# Patient Record
Sex: MALE | Race: WHITE | NOT HISPANIC OR LATINO | Employment: OTHER | URBAN - METROPOLITAN AREA
[De-identification: names, ages, dates, MRNs, and addresses within clinical notes are randomized per-mention and may not be internally consistent; named-entity substitution may affect disease eponyms.]

---

## 2017-01-19 ENCOUNTER — PROCEDURE VISIT (OUTPATIENT)
Dept: NEUROLOGY | Facility: CLINIC | Age: 62
End: 2017-01-19
Payer: COMMERCIAL

## 2017-01-19 DIAGNOSIS — R20.0 NUMBNESS IN FEET: ICD-10-CM

## 2017-01-19 DIAGNOSIS — E11.42 DIABETIC POLYNEUROPATHY ASSOCIATED WITH TYPE 2 DIABETES MELLITUS: Primary | ICD-10-CM

## 2017-01-19 PROCEDURE — 95910 NRV CNDJ TEST 7-8 STUDIES: CPT | Mod: S$GLB,,, | Performed by: PSYCHIATRY & NEUROLOGY

## 2017-01-19 PROCEDURE — 95886 MUSC TEST DONE W/N TEST COMP: CPT | Mod: S$GLB,,, | Performed by: PSYCHIATRY & NEUROLOGY

## 2017-01-19 NOTE — PROCEDURES
EMG - 2 Extremities  Date/Time: 1/19/2017 1:45 PM  Performed by: LATANYA SEYMOUR  Authorized by: LATANYA SEYMOUR     Nerve conduction test  Date/Time: 1/19/2017 1:45 PM  Performed by: LATANYA SEYMOUR  Authorized by: LATANYA SEYMOUR       REPORT OF EMG and NERVE CONDUCTION STUDY    Name: Flo Warren  Date of Study:  1/19/17  Referring Physician:  Ashlee  Test Performed by:  MD Ashlee  Full Values Attached  Informed Consent Scanned.   No anesthesia used.   Amount of Blood Loss: none. The patient tolerated this procedure well.       Informed consent was obtained prior to performing this study. Two patient identifiers were confirmed with the patient prior to performing this study. A time out to determine correct patient and and agreement on procedure performed was conducted prior to the concentric needle examination.    Reason for the study:  Numb feet      Findings:   Nerve conduction studies of the bilateral peroneal motor, bilateral tibial motor, bilateral sural nerves and bilateral H-reflexes were performed.   Amplitudes were reduced throughout without conduction block. Otherwise, distal latencies, conduction velocities were well preservel. H reflexes were absent.   EMG of selected muscles of the bilateral legswere performed as indicated on the attached sheets. There was some decreased recruitment. Otherwise,  Insertional activity was normal without fasciculation or fibrillation, normal sized and phasia of motor units.      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 [facility].     Recommendations (disucssed at this visit):  -Neuropathy symptoms are improved on gabapentin (he is using bid-tid) but some burning at night persists. Change dose to one in the am and 2 in the evenings  -Neuropathic cream helps and can add  metanx per orders unless side effects  -Reduce A1C for better neuropathy control discussed as well.   RTC 4 months

## 2017-01-19 NOTE — MR AVS SNAPSHOT
[facility]  [address] [address]  Phone: [phone]  Fax: [phone]                  Flo Warren   2017 1:00 PM   Procedure visit    Description:  Male : 1955   Provider:  Mahendra Rosado MD   Department:  [facility]           Diagnoses this Visit        Comments    Diabetic polyneuropathy associated with type 2 diabetes mellitus    -  Primary     Numbness in feet                To Do List           Goals (5 Years of Data)     None      Follow-Up and Disposition     Return in about 4 months (around 2017).       These Medications        Disp Refills Start End    mecobal-levomefolat Ca-B6 phos 3-35-2 mg Tab 30 tablet 11 2017     Take 1 tablet by mouth once daily. Ok to substitute - Oral    Pharmacy: [facility]/pharmacy #[id number] - [place], [place] - [address] [address] #: [phone]         [facility]     [facility] On Call Nurse Care Line -  Assistance  Registered nurses in the [facility] On Call Center provide clinical advisement, health education, appointment booking, and other advisory services.  Call for this free service at [phone].             Medications           Message regarding Medications     Verify the changes and/or additions to your medication regime listed below are the same as discussed with your clinician today.  If any of these changes or additions are incorrect, please notify your healthcare provider.        START taking these NEW medications        Refills    mecobal-levomefolat Ca-B6 phos 3-35-2 mg Tab 11    Sig: Take 1 tablet by mouth once daily. Ok to substitute    Class: Normal    Route: Oral           Verify that the below list of medications is an accurate representation of the medications you are currently taking.  If none reported, the list may be blank. If incorrect, please contact your healthcare provider. Carry this list with you in case of emergency.           Current Medications      amlodipine (NORVASC) 5 MG tablet Take 5 mg by mouth once daily.    gabapentin (NEURONTIN) 300 MG capsule Take1 tablet by mouth tid prn    losartan (COZAAR) 100 MG tablet Take 100 mg by mouth once daily.    mecobal-levomefolat Ca-B6 phos 3-35-2 mg Tab Take 1 tablet by mouth once daily. Ok to substitute    multivitamin (ONE DAILY MULTIVITAMIN) per tablet Take 1 tablet by mouth once daily.    NOVOLOG MIX 70-30 FLEXPEN 100 unit/mL (70-30) InPn pen INJECT 52 UNITS UNDER THE SKIN TWICE A DAY **NEEDS APT**    tamsulosin (FLOMAX) 0.4 mg Cp24 Take 1 capsule by mouth nightly.           Clinical Reference Information           Allergies as of 1/19/2017     No Known Allergies      Immunizations Administered on Date of Encounter - 1/19/2017     None      Orders Placed During Today's Visit      Normal Orders This Visit    EMG - 2 Extremities     Nerve conduction test

## 2017-05-22 ENCOUNTER — OFFICE VISIT (OUTPATIENT)
Dept: NEUROLOGY | Facility: CLINIC | Age: 62
End: 2017-05-22
Payer: COMMERCIAL

## 2017-05-22 VITALS
HEART RATE: 68 BPM | DIASTOLIC BLOOD PRESSURE: 88 MMHG | HEIGHT: 75 IN | WEIGHT: 251.31 LBS | SYSTOLIC BLOOD PRESSURE: 160 MMHG | BODY MASS INDEX: 31.25 KG/M2 | RESPIRATION RATE: 18 BRPM

## 2017-05-22 DIAGNOSIS — E66.09 NON MORBID OBESITY DUE TO EXCESS CALORIES: ICD-10-CM

## 2017-05-22 DIAGNOSIS — E11.42 DIABETIC POLYNEUROPATHY ASSOCIATED WITH TYPE 2 DIABETES MELLITUS: Primary | ICD-10-CM

## 2017-05-22 PROCEDURE — 99999 PR PBB SHADOW E&M-EST. PATIENT-LVL III: CPT | Mod: PBBFAC,,, | Performed by: PSYCHIATRY & NEUROLOGY

## 2017-05-22 PROCEDURE — 3046F HEMOGLOBIN A1C LEVEL >9.0%: CPT | Mod: 8P,S$GLB,, | Performed by: PSYCHIATRY & NEUROLOGY

## 2017-05-22 PROCEDURE — 4010F ACE/ARB THERAPY RXD/TAKEN: CPT | Mod: S$GLB,,, | Performed by: PSYCHIATRY & NEUROLOGY

## 2017-05-22 PROCEDURE — 1160F RVW MEDS BY RX/DR IN RCRD: CPT | Mod: S$GLB,,, | Performed by: PSYCHIATRY & NEUROLOGY

## 2017-05-22 PROCEDURE — 99214 OFFICE O/P EST MOD 30 MIN: CPT | Mod: S$GLB,,, | Performed by: PSYCHIATRY & NEUROLOGY

## 2017-05-22 NOTE — PROGRESS NOTES
HPI: Flo Warren is a 62 y.o. male with poorly controlled diabetes with numbness in the feet    Evening burning with gabapentin is relieved.  He has numbness in the bottom feet.  He has no real pain.  He has blood sugar running 130-180, no A1C checked recently.  Metanx was not tried    Review of Systems   Constitutional: Negative for fever.   HENT: Negative for nosebleeds.    Eyes: Negative for double vision.   Respiratory: Negative for hemoptysis.    Cardiovascular: Negative for leg swelling.   Gastrointestinal: Negative for blood in stool.   Genitourinary: Negative for hematuria.   Musculoskeletal: Negative for falls.   Skin: Negative for rash.   Neurological: Positive for sensory change.   Psychiatric/Behavioral: The patient does not have insomnia.          I have reviewed all of this patient's past medical and surgical histories as well as family and social histories and active allergies and medications as documented in the electronic medical record.        Exam:  Gen Appearance, well developed/nourished in no apparent distress  CV: 2+ distal pulses with no edema or swelling  Neuro:  MS: Awake, alert, . Sustains attention. Recent/remote memory intact, Language is full to spontaneous speech//comprehension. Fund of Knowledge is full  CN: Optic discs are flat with normal vasculature, PERRL, Extraoccular movements and visual fields are full. Normal facial sensation and strength, Hearing symmetric, Tongue and Palate are midline and strong. Shoulder Shrug symmetric and strong.  Motor: Normal bulk, tone, no abnormal movements. 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 male with poorly controlled diabetes with numbness in the feet. 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 Neuropathy symptoms are improved with gabapentin dosed at one in the am and 2 in the evenings  2. Neuropathic cream helps and can add metanx can be tried at any point  3. Reduce A1C for better neuropathy control including possible control of symptoms and to prevent worsening symptoms discussed as well. Reduce obesity.       RTC 6 months

## 2017-11-14 ENCOUNTER — OFFICE VISIT (OUTPATIENT)
Dept: NEUROLOGY | Facility: CLINIC | Age: 62
End: 2017-11-14
Payer: COMMERCIAL

## 2017-11-14 VITALS
HEIGHT: 75 IN | BODY MASS INDEX: 31.39 KG/M2 | HEART RATE: 70 BPM | WEIGHT: 252.44 LBS | SYSTOLIC BLOOD PRESSURE: 140 MMHG | RESPIRATION RATE: 16 BRPM | DIASTOLIC BLOOD PRESSURE: 68 MMHG

## 2017-11-14 DIAGNOSIS — E11.42 DIABETIC POLYNEUROPATHY ASSOCIATED WITH TYPE 2 DIABETES MELLITUS: Primary | ICD-10-CM

## 2017-11-14 DIAGNOSIS — E66.09 NON MORBID OBESITY DUE TO EXCESS CALORIES: ICD-10-CM

## 2017-11-14 PROCEDURE — 99999 PR PBB SHADOW E&M-EST. PATIENT-LVL III: CPT | Mod: PBBFAC,,, | Performed by: PSYCHIATRY & NEUROLOGY

## 2017-11-14 PROCEDURE — 99214 OFFICE O/P EST MOD 30 MIN: CPT | Mod: S$GLB,,, | Performed by: PSYCHIATRY & NEUROLOGY

## 2017-11-14 RX ORDER — DULAGLUTIDE 0.75 MG/.5ML
0.5 INJECTION, SOLUTION SUBCUTANEOUS WEEKLY
Refills: 1 | COMMUNITY
Start: 2017-10-23 | End: 2019-05-23

## 2017-11-14 RX ORDER — PRAVASTATIN SODIUM 20 MG/1
1 TABLET ORAL NIGHTLY
Refills: 11 | COMMUNITY
Start: 2017-11-11

## 2017-11-14 RX ORDER — GABAPENTIN 300 MG/1
CAPSULE ORAL
Qty: 120 CAPSULE | Refills: 11 | Status: SHIPPED | OUTPATIENT
Start: 2017-11-14 | End: 2018-12-03 | Stop reason: SDUPTHER

## 2017-11-14 RX ORDER — POTASSIUM CHLORIDE 750 MG/1
10 TABLET, EXTENDED RELEASE ORAL ONCE
COMMUNITY

## 2017-11-14 NOTE — PROGRESS NOTES
HPI:  Flo Warren is a 62 y.o. male with poorly controlled diabetes with numbness in the feet. 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 Last level was just above 7  His numbness in this feet is not active during the day. He feels increased numbness in the evening (after taking his shoes off).   He no longer has pain in the feet.   He does not snore, cough or gasp for air.    Review of Systems   Constitutional: Negative for fever.   HENT: Negative for nosebleeds.    Eyes: Negative for double vision.   Respiratory: Negative for hemoptysis.    Cardiovascular: Negative for chest pain and leg swelling.   Gastrointestinal: Negative for blood in stool.   Genitourinary: Negative for hematuria.   Musculoskeletal: Negative for falls.   Skin: Negative for rash.   Neurological: Positive for sensory change.   Psychiatric/Behavioral: The patient is not nervous/anxious.          I have reviewed all of this patient's past medical and surgical histories as well as family and social histories and active allergies and medications as documented in the electronic medical record.        Exam:  Gen Appearance, well developed/nourished in no apparent distress  CV: 2+ distal pulses with no edema or swelling  Neuro:  MS: Awake, alert, . Sustains attention. Recent/remote memory intact, Language is full to spontaneous speech/comprehension. Fund of Knowledge is full  CN: Optic discs are flat with normal vasculature, PERRL, Extraoccular movements and visual fields are full. Normal facial sensation and strength, Hearing symmetric, Tongue and Palate are midline and strong. Shoulder Shrug symmetric and strong.  Motor: Normal bulk, tone, no abnormal movements. 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 male with poorly controlled diabetes with numbness in the feet. 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 Neuropathy symptoms are improved during the daywith gabapentin dosed at one in the am and 2 in the evenings but he has breakthrough symptoms in the early evening- can raise dose to 1 in the am, 1 in the late mid day and 2 at night OR take none in the am, 1 in the mid day and 2 in the evening Unless sedation, mood changes or other side effects  2. Neuropathic cream helps and can and metanx can be tried at any point. Can try OTC B12 1000mcg to see if this helps symptoms  3. Reduce A1C (goal less than 7) for better neuropathy control including possible control of symptoms and to prevent worsening symptoms discussed as well. Reduce obesity.       RTC 6 months

## 2017-11-14 NOTE — PATIENT INSTRUCTIONS
You can change gabapentin to none in the am, 1 in the mid day, and 2 at night to see if this helps your early evening numbness.  IF pain breakthrough in the am, you can increase to 1 in the am, 1 at mid day and 2 at night.   Try B12 1000mcg daily for about 3 months to see if this helps your symptoms.

## 2018-12-03 RX ORDER — GABAPENTIN 300 MG/1
CAPSULE ORAL
Qty: 360 CAPSULE | Refills: 0 | Status: SHIPPED | OUTPATIENT
Start: 2018-12-03 | End: 2019-02-28 | Stop reason: SDUPTHER

## 2019-02-28 RX ORDER — GABAPENTIN 300 MG/1
CAPSULE ORAL
Qty: 360 CAPSULE | Refills: 1 | Status: SHIPPED | OUTPATIENT
Start: 2019-02-28 | End: 2019-05-23

## 2019-05-23 ENCOUNTER — OFFICE VISIT (OUTPATIENT)
Dept: NEUROLOGY | Facility: CLINIC | Age: 64
End: 2019-05-23
Payer: MEDICARE

## 2019-05-23 VITALS
BODY MASS INDEX: 30.51 KG/M2 | HEART RATE: 86 BPM | RESPIRATION RATE: 16 BRPM | HEIGHT: 75 IN | DIASTOLIC BLOOD PRESSURE: 72 MMHG | SYSTOLIC BLOOD PRESSURE: 138 MMHG | WEIGHT: 245.38 LBS

## 2019-05-23 DIAGNOSIS — E11.42 DIABETIC POLYNEUROPATHY ASSOCIATED WITH TYPE 2 DIABETES MELLITUS: Primary | ICD-10-CM

## 2019-05-23 DIAGNOSIS — E66.09 NON MORBID OBESITY DUE TO EXCESS CALORIES: ICD-10-CM

## 2019-05-23 PROCEDURE — 1101F PT FALLS ASSESS-DOCD LE1/YR: CPT | Mod: CPTII,S$GLB,, | Performed by: PSYCHIATRY & NEUROLOGY

## 2019-05-23 PROCEDURE — 1101F PR PT FALLS ASSESS DOC 0-1 FALLS W/OUT INJ PAST YR: ICD-10-PCS | Mod: CPTII,S$GLB,, | Performed by: PSYCHIATRY & NEUROLOGY

## 2019-05-23 PROCEDURE — 99999 PR PBB SHADOW E&M-EST. PATIENT-LVL III: CPT | Mod: PBBFAC,,, | Performed by: PSYCHIATRY & NEUROLOGY

## 2019-05-23 PROCEDURE — 99999 PR PBB SHADOW E&M-EST. PATIENT-LVL III: ICD-10-PCS | Mod: PBBFAC,,, | Performed by: PSYCHIATRY & NEUROLOGY

## 2019-05-23 PROCEDURE — 99214 PR OFFICE/OUTPT VISIT, EST, LEVL IV, 30-39 MIN: ICD-10-PCS | Mod: S$GLB,,, | Performed by: PSYCHIATRY & NEUROLOGY

## 2019-05-23 PROCEDURE — 99214 OFFICE O/P EST MOD 30 MIN: CPT | Mod: S$GLB,,, | Performed by: PSYCHIATRY & NEUROLOGY

## 2019-05-23 RX ORDER — GABAPENTIN 600 MG/1
600 TABLET ORAL 2 TIMES DAILY
Qty: 180 TABLET | Refills: 3 | Status: SHIPPED | OUTPATIENT
Start: 2019-05-23 | End: 2020-05-22

## 2020-11-13 NOTE — PROGRESS NOTES
Detail Level: Zone HPI: Flo Warren is a 64 y.o. male with poorly controlled diabetes with numbness in the feet. 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 No more tingling and some sensitivity at times/ tolerable    The patient lost 7 pounds since the last visit via cutting his sweets  A1C is followed by PCP and is now 6    He is using B12 as well    Review of Systems   Constitutional: Negative for fever.   HENT: Negative for nosebleeds.    Eyes: Negative for double vision.   Respiratory: Negative for hemoptysis.    Cardiovascular: Negative for chest pain and leg swelling.   Gastrointestinal: Negative for blood in stool.   Genitourinary: Negative for hematuria.   Musculoskeletal: Negative for falls.   Skin: Negative for rash.   Neurological: Positive for sensory change.   Psychiatric/Behavioral: The patient is not nervous/anxious.          I have reviewed all of this patient's past medical and surgical histories as well as family and social histories and active allergies and medications as documented in the electronic medical record.        Exam:  Gen Appearance, well developed/nourished in no apparent distress  CV: 2+ distal pulses with no edema or swelling  Neuro:  MS: Awake, alert, . Sustains attention. Recent/remote memory intact, Language is full to spontaneous speech/comprehension. Fund of Knowledge is full  CN: Optic discs are flat with normal vasculature, PERRL, Extraoccular movements and visual fields are full. Normal facial sensation and strength, Hearing symmetric, Tongue and Palate are midline and strong. Shoulder Shrug symmetric and strong.  Motor: Normal bulk, tone, no abnormal movements. 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 male with poorly controlled diabetes with numbness in the feet. 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 Neuropathy symptoms are better (no tingling, but some sensitivity at times/ tolerable) with gabapentin 300mg dosed 2 in the am and 2 at night. Will change Rx to 600mg BID for ease  2. Neuropathic cream helps and can and metanx can be tried at any point. OTC B12 helps with neuropathy symptoms  3. Continue good control of  A1C (at goal of less than 7) for better neuropathy control including possible control of symptoms and to prevent worsening symptoms discussed as well. Continue to educe obesity.   4. Neuropathy precautions reviewed. Check feet for sores/caution in low light situations and on stairs    RTC 1 year